# Patient Record
Sex: MALE | Race: WHITE | HISPANIC OR LATINO | URBAN - METROPOLITAN AREA
[De-identification: names, ages, dates, MRNs, and addresses within clinical notes are randomized per-mention and may not be internally consistent; named-entity substitution may affect disease eponyms.]

---

## 2022-02-15 ENCOUNTER — EMERGENCY (EMERGENCY)
Facility: HOSPITAL | Age: 22
LOS: 1 days | Discharge: HOME | End: 2022-02-15
Attending: EMERGENCY MEDICINE
Payer: COMMERCIAL

## 2022-02-15 VITALS
HEART RATE: 97 BPM | SYSTOLIC BLOOD PRESSURE: 162 MMHG | TEMPERATURE: 99 F | DIASTOLIC BLOOD PRESSURE: 84 MMHG | RESPIRATION RATE: 16 BRPM | OXYGEN SATURATION: 97 %

## 2022-02-15 VITALS
HEART RATE: 90 BPM | RESPIRATION RATE: 16 BRPM | TEMPERATURE: 99 F | SYSTOLIC BLOOD PRESSURE: 149 MMHG | OXYGEN SATURATION: 98 % | DIASTOLIC BLOOD PRESSURE: 81 MMHG

## 2022-02-15 DIAGNOSIS — F43.23 ADJUSTMENT DISORDER WITH MIXED ANXIETY AND DEPRESSED MOOD: ICD-10-CM

## 2022-02-15 LAB
ALBUMIN SERPL ELPH-MCNC: 4.4 G/DL — SIGNIFICANT CHANGE UP (ref 3.3–5.2)
ALP SERPL-CCNC: 104 U/L — SIGNIFICANT CHANGE UP (ref 40–120)
ALT FLD-CCNC: 34 U/L — SIGNIFICANT CHANGE UP
AMPHET UR-MCNC: NEGATIVE — SIGNIFICANT CHANGE UP
ANION GAP SERPL CALC-SCNC: 11 MMOL/L — SIGNIFICANT CHANGE UP (ref 5–17)
APAP SERPL-MCNC: <3 UG/ML — LOW (ref 10–26)
AST SERPL-CCNC: 24 U/L — SIGNIFICANT CHANGE UP
BARBITURATES UR SCN-MCNC: NEGATIVE — SIGNIFICANT CHANGE UP
BASOPHILS # BLD AUTO: 0.03 K/UL — SIGNIFICANT CHANGE UP (ref 0–0.2)
BASOPHILS NFR BLD AUTO: 0.4 % — SIGNIFICANT CHANGE UP (ref 0–2)
BENZODIAZ UR-MCNC: NEGATIVE — SIGNIFICANT CHANGE UP
BILIRUB SERPL-MCNC: 0.5 MG/DL — SIGNIFICANT CHANGE UP (ref 0.4–2)
BUN SERPL-MCNC: 15.7 MG/DL — SIGNIFICANT CHANGE UP (ref 8–20)
CALCIUM SERPL-MCNC: 9.1 MG/DL — SIGNIFICANT CHANGE UP (ref 8.6–10.2)
CHLORIDE SERPL-SCNC: 103 MMOL/L — SIGNIFICANT CHANGE UP (ref 98–107)
CO2 SERPL-SCNC: 25 MMOL/L — SIGNIFICANT CHANGE UP (ref 22–29)
COCAINE METAB.OTHER UR-MCNC: NEGATIVE — SIGNIFICANT CHANGE UP
CREAT SERPL-MCNC: 0.76 MG/DL — SIGNIFICANT CHANGE UP (ref 0.5–1.3)
EOSINOPHIL # BLD AUTO: 0.07 K/UL — SIGNIFICANT CHANGE UP (ref 0–0.5)
EOSINOPHIL NFR BLD AUTO: 0.9 % — SIGNIFICANT CHANGE UP (ref 0–6)
ETHANOL SERPL-MCNC: <10 MG/DL — SIGNIFICANT CHANGE UP (ref 0–9)
GLUCOSE SERPL-MCNC: 117 MG/DL — HIGH (ref 70–99)
HCT VFR BLD CALC: 46.8 % — SIGNIFICANT CHANGE UP (ref 39–50)
HGB BLD-MCNC: 15.8 G/DL — SIGNIFICANT CHANGE UP (ref 13–17)
IMM GRANULOCYTES NFR BLD AUTO: 0.4 % — SIGNIFICANT CHANGE UP (ref 0–1.5)
LYMPHOCYTES # BLD AUTO: 2.44 K/UL — SIGNIFICANT CHANGE UP (ref 1–3.3)
LYMPHOCYTES # BLD AUTO: 31.5 % — SIGNIFICANT CHANGE UP (ref 13–44)
MCHC RBC-ENTMCNC: 27.5 PG — SIGNIFICANT CHANGE UP (ref 27–34)
MCHC RBC-ENTMCNC: 33.8 GM/DL — SIGNIFICANT CHANGE UP (ref 32–36)
MCV RBC AUTO: 81.5 FL — SIGNIFICANT CHANGE UP (ref 80–100)
METHADONE UR-MCNC: NEGATIVE — SIGNIFICANT CHANGE UP
MONOCYTES # BLD AUTO: 0.33 K/UL — SIGNIFICANT CHANGE UP (ref 0–0.9)
MONOCYTES NFR BLD AUTO: 4.3 % — SIGNIFICANT CHANGE UP (ref 2–14)
NEUTROPHILS # BLD AUTO: 4.84 K/UL — SIGNIFICANT CHANGE UP (ref 1.8–7.4)
NEUTROPHILS NFR BLD AUTO: 62.5 % — SIGNIFICANT CHANGE UP (ref 43–77)
OPIATES UR-MCNC: NEGATIVE — SIGNIFICANT CHANGE UP
PCP SPEC-MCNC: SIGNIFICANT CHANGE UP
PCP UR-MCNC: NEGATIVE — SIGNIFICANT CHANGE UP
PLATELET # BLD AUTO: 304 K/UL — SIGNIFICANT CHANGE UP (ref 150–400)
POTASSIUM SERPL-MCNC: 4.6 MMOL/L — SIGNIFICANT CHANGE UP (ref 3.5–5.3)
POTASSIUM SERPL-SCNC: 4.6 MMOL/L — SIGNIFICANT CHANGE UP (ref 3.5–5.3)
PROT SERPL-MCNC: 7.3 G/DL — SIGNIFICANT CHANGE UP (ref 6.6–8.7)
RBC # BLD: 5.74 M/UL — SIGNIFICANT CHANGE UP (ref 4.2–5.8)
RBC # FLD: 12.9 % — SIGNIFICANT CHANGE UP (ref 10.3–14.5)
SALICYLATES SERPL-MCNC: <0.6 MG/DL — LOW (ref 10–20)
SODIUM SERPL-SCNC: 139 MMOL/L — SIGNIFICANT CHANGE UP (ref 135–145)
THC UR QL: NEGATIVE — SIGNIFICANT CHANGE UP
WBC # BLD: 7.74 K/UL — SIGNIFICANT CHANGE UP (ref 3.8–10.5)
WBC # FLD AUTO: 7.74 K/UL — SIGNIFICANT CHANGE UP (ref 3.8–10.5)

## 2022-02-15 PROCEDURE — 93005 ELECTROCARDIOGRAM TRACING: CPT

## 2022-02-15 PROCEDURE — 99284 EMERGENCY DEPT VISIT MOD MDM: CPT

## 2022-02-15 PROCEDURE — 99283 EMERGENCY DEPT VISIT LOW MDM: CPT

## 2022-02-15 PROCEDURE — 80053 COMPREHEN METABOLIC PANEL: CPT

## 2022-02-15 PROCEDURE — 80307 DRUG TEST PRSMV CHEM ANLYZR: CPT

## 2022-02-15 PROCEDURE — 90792 PSYCH DIAG EVAL W/MED SRVCS: CPT

## 2022-02-15 PROCEDURE — 85025 COMPLETE CBC W/AUTO DIFF WBC: CPT

## 2022-02-15 PROCEDURE — 36415 COLL VENOUS BLD VENIPUNCTURE: CPT

## 2022-02-15 PROCEDURE — 93010 ELECTROCARDIOGRAM REPORT: CPT

## 2022-02-15 NOTE — ED PROVIDER NOTE - PROGRESS NOTE DETAILS
Billy PGY-3: Pt reassessed, pt feeling better at this time, vss, pt able to walk, talk and vocalized plan of action. Discussed in depth and explained to pt in depth the next steps that need to be taking including proper follow up with PCP or specialists. All incidental findings were discussed with pt as well. Pt verbalized their concerns and all questions were answered. Pt understands dispo and wants discharge. Given good instructions when to return to ED and importance of f/u.

## 2022-02-15 NOTE — ED BEHAVIORAL HEALTH ASSESSMENT NOTE - DESCRIPTION
denies Patient reported he completed HS. He was raised by both parents. He has one sister.  His parents were born in [place].  He is 1st generation. He plans to eventually return to school, Patient has not been aggressive or agitated. He was calm, cooperative and appeared forthcoming on interview.  He denied any S/H I/I/P. He was not in physical distress. Labwork was unremarkable. He denied any S/H I/I/P.  ICU Vital Signs Last 24 Hrs  T(C): 37 (15 Feb 2022 11:09), Max: 37.1 (15 Feb 2022 06:58)  T(F): 98.6 (15 Feb 2022 11:09), Max: 98.8 (15 Feb 2022 06:58)  HR: 90 (15 Feb 2022 11:09) (90 - 97)  BP: 149/81 (15 Feb 2022 11:09) (149/81 - 162/84)  BP(mean): --  ABP: --  ABP(mean): --  RR: 16 (15 Feb 2022 11:09) (16 - 16)  SpO2: 98% (15 Feb 2022 11:09) (97% - 98%)

## 2022-02-15 NOTE — ED BEHAVIORAL HEALTH ASSESSMENT NOTE - SUMMARY
Patient is a 21 year old, male; domiciled with gf of 3 years and new born (born in early January), currently employed as a town worker;  with no formal past psychiatric history of; no psychiatric  hospitalizations; no known suicide attempts; no known history of violence or arrests; no active substance abuse or known history of complicated withdrawal; denies significant family history ; brought self to ER wanting to talk to someone before having a breakdown.  Patient with symptoms consistent with adjustment disorder with mixed anxiety and depressed mood triggered by multiple stressors (, lack of support, financial problems, difficulty at work).  Patient denies any S/H I/I/P, and is help seeking.  He responded well to support and is appropriate for outpatient level of care.  Will refer to KRISTEL.

## 2022-02-15 NOTE — ED BEHAVIORAL HEALTH ASSESSMENT NOTE - RISK ASSESSMENT
Low Acute Suicide Risk Low: Patient w/ multiple stressors, poor sleep, denies any S/H I/I/P, no prior suicide attempts, no h/o treatment, denies panic attacks, no substance use, feels he has limited support, he remains future oriented and help seeking.

## 2022-02-15 NOTE — ED BEHAVIORAL HEALTH ASSESSMENT NOTE - HPI (INCLUDE ILLNESS QUALITY, SEVERITY, DURATION, TIMING, CONTEXT, MODIFYING FACTORS, ASSOCIATED SIGNS AND SYMPTOMS)
Patient is a 21 year old, male; domiciled with gf of 3 years and new born (born in early January), currently employed as a town worker;  with no formal past psychiatric history of; no psychiatric  hospitalizations; no known suicide attempts; no known history of violence or arrests; no active substance abuse or known history of complicated withdrawal; denies significant family history ; brought self to ER wanting to talk to someone before having a breakdown.         Patient reported that he is stressed at home with a  who is colicky and cries constantly. He reports also financial stressors. His gf's parents have also been struggling with medical problems so have been less of support than anticipated.  He was heading to work today but felt that he it was more prudent to come to ER, because he was concerned that he was not in the right mental state and could get into a fight at work.         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  The pregnancy was  unexpected but he was happy about it.   The  early course was complicated by withdrawal from Effexor and he believes his girlfriend feels guilty and more overwhelmed as a result.  Patient has gained 100 lbs himself since leaving the army.  He reports that this is a result of poor diet.            Patient denies feeling subjectively depresssed, but does report some feelings of worthlessness and feeling useless.  He does report that baby cheers him up. He feels more stressed since . He feels less motivated w/ less energy. He is sleeping poorly and often feels drained.  He describes long standing difficulty focusing but it is worse after . He has been having some conflicts at work with coworkers.  He describes driving and listening to music as coping mechanisms.       He admits to increase anxiety and he worries about baby and gf when he is away from them.  He denies any panic attacks.     Concerning other psychiatric symptoms, pt denies  any aggressive or violent behavior towards others.  Pt denies any episodes of bizarre happiness, unusual energy, unusual nightime excitation or other common symptoms of valeria. Pt denies hearing voices or seeing things.  No delusions were elicited.  He denies any substance use.      Treatment option were discussed.  He is agreement with referral to outpatient treatment. He feels safe going home.

## 2022-02-15 NOTE — ED ADULT TRIAGE NOTE - CHIEF COMPLAINT QUOTE
Pt. states he wants to be seen before he has a "breakdown". Pt. states hes is stressed at home with a , pt. states he didn't want to go to work because he thinks hell get angry and get in a fight. Pt. denies SI/HI/ AH/VH drugs or alchol. Pt. denies any psych hx, thinks he might be depressed and have anxiety.

## 2022-02-15 NOTE — ED ADULT NURSE NOTE - SUICIDE PROTECTIVE FACTORS
Responsibility to family and others/Identifies reasons for living/Supportive social network of family or friends/Fear of death or the actual act of killing self

## 2022-02-15 NOTE — ED BEHAVIORAL HEALTH ASSESSMENT NOTE - SAFETY PLAN ADDT'L DETAILS
Education provided regarding environmental safety / lethal means restriction/Provision of National Suicide Prevention Lifeline 4-879-233-TALK (6041)

## 2022-02-15 NOTE — ED PROVIDER NOTE - NSFOLLOWUPINSTRUCTIONS_ED_ALL_ED_FT
[facility]  [address] [place] [id number]  [phone]        -- Please follow up with your doctor(s) within the next 3 days, but seek medical attention sooner if your symptoms persist or worsen.  Please call tomorrow for an appointment.  If you cannot follow-up with your primary doctor please return to the ED for any urgent issues.    -- You were given a copy of your labs and imaging.  Please go over these with your doctor(s).     -- If you have any worsening of symptoms or any other concerns please see your doctor or return to the nearest emergency room immediately.    -- Please continue taking your home medications as directed.  Do not use alcohol when taking any medication (especially antibiotics, tylenol or other pain medication) unless you check with the doctor or pharmacist.    **************************************************************************************************************

## 2022-02-15 NOTE — ED ADULT NURSE NOTE - HPI (INCLUDE ILLNESS QUALITY, SEVERITY, DURATION, TIMING, CONTEXT, MODIFYING FACTORS, ASSOCIATED SIGNS AND SYMPTOMS)
Pt is A&Ox4, denies any psych history, states that this current episode has started about a month ago, onset of his stress began when his son was born premature, pt states it was sparingly intermittent, which has now progressed to more frequency, but not daily, pt states the episodes last several hours and make it difficult to sleep, function through his day. Pt states he has anxiety when going to work and when he is performing daily tasks at home.

## 2022-02-15 NOTE — ED ADULT NURSE NOTE - OBJECTIVE STATEMENT
Pt is a 21YOM with no previous medical history, pt arrived in the Emergency Department for evaluation of a feeling of a "breakdown". Pt is A&Ox4, pt states that he was recently  from the  and has had struggles in his daily life that have caused him distress. 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 pt is denying any HI/SI, pt states he has no psych history [facility], pt states he is receiving no treatment and just wants to talk to someone before he has problems. Pt denies any alcohol or drug use.

## 2022-02-15 NOTE — ED BEHAVIORAL HEALTH ASSESSMENT NOTE - OTHER PAST PSYCHIATRIC HISTORY (INCLUDE DETAILS REGARDING ONSET, COURSE OF ILLNESS, INPATIENT/OUTPATIENT TREATMENT)
Pt denies any prior history of formal psychiatric treatment.  Pt has no prior inpatient hospitalization.  No prior h/o outpatient treatment and no h/o psychotropic medications.  No prior suicide attempts.

## 2022-02-15 NOTE — ED ADULT NURSE NOTE - NSIMPLEMENTINTERV_GEN_ALL_ED
Implemented All Universal Safety Interventions:  [place] to call system. Call bell, personal items and telephone within reach. Instruct patient to call for assistance. Room bathroom lighting operational. Non-slip footwear when patient is off stretcher. Physically safe environment: no spills, clutter or unnecessary equipment. Stretcher in lowest position, wheels locked, appropriate side rails in place.

## 2022-02-15 NOTE — ED BEHAVIORAL HEALTH ASSESSMENT NOTE - PREPARATORY ACTS:
[Consultation] : a consultation visit [Patient] : patient [Mother] : mother [Medical Records] : medical records None known

## 2022-02-15 NOTE — ED PROVIDER NOTE - PATIENT PORTAL LINK FT
You can access the FollowMyHealth Patient Portal offered by [facility] by registering at the following website: [email or web address]. By joining [employer]’s FollowMyHealth portal, you will also be able to view your health information using other applications (apps) compatible with our system.

## 2022-02-28 PROBLEM — Z78.9 OTHER SPECIFIED HEALTH STATUS: Chronic | Status: ACTIVE | Noted: 2022-02-15

## 2022-03-31 ENCOUNTER — NON-APPOINTMENT (OUTPATIENT)
Age: 22
End: 2022-03-31

## 2022-03-31 ENCOUNTER — APPOINTMENT (OUTPATIENT)
Dept: OTOLARYNGOLOGY | Facility: CLINIC | Age: 22
End: 2022-03-31
Payer: COMMERCIAL

## 2022-03-31 VITALS
HEART RATE: 84 BPM | BODY MASS INDEX: 40.09 KG/M2 | WEIGHT: 280 LBS | DIASTOLIC BLOOD PRESSURE: 75 MMHG | TEMPERATURE: 97.7 F | SYSTOLIC BLOOD PRESSURE: 127 MMHG | HEIGHT: 70 IN

## 2022-03-31 DIAGNOSIS — R07.0 PAIN IN THROAT: ICD-10-CM

## 2022-03-31 PROBLEM — Z00.00 ENCOUNTER FOR PREVENTIVE HEALTH EXAMINATION: Status: ACTIVE | Noted: 2022-03-31

## 2022-03-31 PROCEDURE — 99204 OFFICE O/P NEW MOD 45 MIN: CPT | Mod: 25

## 2022-03-31 PROCEDURE — 31575 DIAGNOSTIC LARYNGOSCOPY: CPT

## 2022-03-31 PROCEDURE — 69210 REMOVE IMPACTED EAR WAX UNI: CPT

## 2022-03-31 NOTE — ASSESSMENT
[FreeTextEntry1] : 22 y/o M with gagging while brushing his teeth. Benign exam, no masses or lesions noted. Tested gagging reflex, normal laryngeal exam. \par - referred to dentist for brushing ritual, seems to only start after several minutes of brushing, will try changing up flavor of toothpaste and \par \par \par \par \par Pt also with R CI which was cleared today - felt immediate resolution of sx \par Pt with R CI \par b/l ears cleaned \par avoid q tips \par ear hygiene\par discussed preventive measures and signs of accumulation\par

## 2022-03-31 NOTE — PROCEDURE
[FreeTextEntry3] : Procedure- removal of cerumen right \par Diagnosis - right cerumen impaction\par Right ear found to have impacted cerumen - it was cleared with suction and curette, canal appeared normal.\par  [de-identified] : Procedure performed: laryngeal Endoscopy- Diagnostic\par Pre-op/post op indication: throat discomfort \par Verbal and/or written consent obtained from patient, Patient was unable to cooperate with mirror\par Scope #: 3, flexible fiber optic telescope used \par Scope was introduced through the nose passed on the floor of the nose to the nasopharynx and then followed down the soft palate to the lower pharynx. The tongue Base, Larynx, Hypopharynx were examined. Base of tongue was symmetric, vallecular was clear, epiglottis was not deformed, subglottis/ pyriform and posterior pharyngeal walls were clear. no erythema, edema, pooling of secretions, masses or lesions. Airway patent, no foreign body visualized. No glottic/supraglottic edema. True vocal cords, arytenoids, vestibular folds, ventricles, pyriform sinuses, and aryepiglottic folds appear normal bilaterally. Vocal cords mobile with good contact b/l.\par \par

## 2022-03-31 NOTE — PHYSICAL EXAM
[Normal] : mucosa is normal [Midline] : trachea located in midline position [de-identified] : BALDEMAR MORGAN

## 2022-03-31 NOTE — REASON FOR VISIT
[Initial Consultation] : an initial consultation for [FreeTextEntry2] : gagging when brushing teeth

## 2022-03-31 NOTE — HISTORY OF PRESENT ILLNESS
[de-identified] : 20 y/o M c/o gagging when brushing his teeth x 6 months, states feels the needs to vomit when brushing teeth or using mouth wash. pt states he tried changing his toothpaste, and toothbrush with no relief. \par Pt saw dentist- dental exam- WNL. Pt was referred over to ENT. \par Pt used to smoke cigerattes, quit last year. \par pt dysphagia, dysphonia, difficulty talking or breathing \par \par Pt also with intermittent b/l ear pressure x a year \par no changes in hearing\par no Vertigo, tinnitus, pain, drainage or facial weakness.\par

## 2022-03-31 NOTE — END OF VISIT
[FreeTextEntry3] : I personally saw and examined CRUZ JUAN in detail. I spoke to PRATIMA Lovett regarding the assessment and plan of care.  I preformed the procedures and I reviewed the above assessment and plan of care, and agree. I have made changes in changes in the body of the note where appropriate.\par \par

## 2022-12-11 ENCOUNTER — NON-APPOINTMENT (OUTPATIENT)
Age: 22
End: 2022-12-11

## 2022-12-12 ENCOUNTER — NON-APPOINTMENT (OUTPATIENT)
Age: 22
End: 2022-12-12

## 2023-03-03 ENCOUNTER — APPOINTMENT (OUTPATIENT)
Dept: NEUROLOGY | Facility: CLINIC | Age: 23
End: 2023-03-03

## 2023-07-19 ENCOUNTER — NON-APPOINTMENT (OUTPATIENT)
Age: 23
End: 2023-07-19

## 2023-09-14 ENCOUNTER — APPOINTMENT (OUTPATIENT)
Dept: OTOLARYNGOLOGY | Facility: CLINIC | Age: 23
End: 2023-09-14
Payer: COMMERCIAL

## 2023-09-14 ENCOUNTER — NON-APPOINTMENT (OUTPATIENT)
Age: 23
End: 2023-09-14

## 2023-09-14 VITALS
DIASTOLIC BLOOD PRESSURE: 81 MMHG | SYSTOLIC BLOOD PRESSURE: 125 MMHG | HEART RATE: 84 BPM | TEMPERATURE: 97.6 F | HEIGHT: 70 IN | BODY MASS INDEX: 41.23 KG/M2 | WEIGHT: 288 LBS

## 2023-09-14 DIAGNOSIS — H61.21 IMPACTED CERUMEN, RIGHT EAR: ICD-10-CM

## 2023-09-14 DIAGNOSIS — H93.8X1 OTHER SPECIFIED DISORDERS OF RIGHT EAR: ICD-10-CM

## 2023-09-14 PROCEDURE — 69210 REMOVE IMPACTED EAR WAX UNI: CPT

## 2023-09-14 PROCEDURE — 99213 OFFICE O/P EST LOW 20 MIN: CPT | Mod: 25

## 2024-01-08 ENCOUNTER — NON-APPOINTMENT (OUTPATIENT)
Age: 24
End: 2024-01-08

## 2024-08-29 ENCOUNTER — NON-APPOINTMENT (OUTPATIENT)
Age: 24
End: 2024-08-29

## 2024-09-01 ENCOUNTER — NON-APPOINTMENT (OUTPATIENT)
Age: 24
End: 2024-09-01